# Patient Record
Sex: FEMALE | Race: WHITE | Employment: FULL TIME | URBAN - METROPOLITAN AREA
[De-identification: names, ages, dates, MRNs, and addresses within clinical notes are randomized per-mention and may not be internally consistent; named-entity substitution may affect disease eponyms.]

---

## 2021-06-06 ENCOUNTER — HOSPITAL ENCOUNTER (EMERGENCY)
Age: 61
Discharge: HOME | End: 2021-06-06
Payer: COMMERCIAL

## 2021-06-06 VITALS
RESPIRATION RATE: 20 BRPM | WEIGHT: 112 LBS | HEIGHT: 60 IN | OXYGEN SATURATION: 98 % | BODY MASS INDEX: 21.99 KG/M2 | HEART RATE: 70 BPM | DIASTOLIC BLOOD PRESSURE: 66 MMHG | SYSTOLIC BLOOD PRESSURE: 112 MMHG | TEMPERATURE: 98.4 F

## 2021-06-06 DIAGNOSIS — J34.89 INFECTED LESION IN NOSE: Primary | ICD-10-CM

## 2021-06-06 PROCEDURE — 99211 OFF/OP EST MAY X REQ PHY/QHP: CPT

## 2021-06-06 RX ORDER — DOXYCYCLINE HYCLATE 100 MG
100 TABLET ORAL 2 TIMES DAILY
Qty: 14 TABLET | Refills: 0 | Status: SHIPPED | OUTPATIENT
Start: 2021-06-06 | End: 2021-06-13

## 2021-06-06 NOTE — ED PROVIDER NOTES
[address] [facility]  Department of Emergency Medicine  UC Encounter Note  21   3:08 PM EDT      NAME: Rene Cavazos  :  1960  MRN:  [id number]    Chief Complaint: Facial Pain (has had nasal infection  right side  of nasal  sore  was bleeding )      This is a 51-year-old female the presents to urgent care complaining of a tender sore area to the right nostril that she noticed recently. She states there was some bleeding from that site recently. He denies injury. She denies being on any blood thinners. She states that there is some tenderness to the right maxillary sinus area. She denies any difficulty breathing or swallowing. No chest pain. On first contact patient she appears to be in no acute distress. Review of Systems  Pertinent positives and negatives are stated within HPI, all other systems reviewed and are negative. Physical Exam  Vitals and nursing note reviewed. Constitutional:       Appearance: She is well-developed. HENT:      Head: Normocephalic and atraumatic. Right Ear: Hearing and external ear normal.      Left Ear: Hearing and external ear normal.      Nose: Nasal tenderness present. No nasal deformity, signs of injury, laceration or mucosal edema. Right Nostril: No foreign body, epistaxis, septal hematoma or occlusion. Left Nostril: No epistaxis, septal hematoma or occlusion. Right Turbinates: Not enlarged, swollen or pale. Left Turbinates: Not enlarged, swollen or pale. Right Sinus: Maxillary sinus tenderness present. No frontal sinus tenderness. Left Sinus: No maxillary sinus tenderness or frontal sinus tenderness. Comments: In the anterior right nasal septum area she does have an erosion of the skin about 3 mm in diameter. No active bleeding at this time. Mouth/Throat:      Pharynx: Uvula midline.    Eyes:      General: Lids are normal.      Conjunctiva/sclera: Conjunctivae normal.      Pupils: Pupils are equal, round, and reactive to light. Cardiovascular:      Rate and Rhythm: Normal rate and regular rhythm. Heart sounds: Normal heart sounds. No murmur heard. Pulmonary:      Effort: Pulmonary effort is normal.      Breath sounds: Normal breath sounds. Abdominal:      General: Bowel sounds are normal.      Palpations: Abdomen is soft. Abdomen is not rigid. Tenderness: There is no abdominal tenderness. There is no guarding or rebound. Musculoskeletal:      Cervical back: Normal range of motion and neck supple. Skin:     General: Skin is warm and dry. Findings: No abrasion or rash. Neurological:      Mental Status: She is alert and oriented to person, place, and time. GCS: GCS eye subscore is 4. GCS verbal subscore is 5. GCS motor subscore is 6. Cranial Nerves: No cranial nerve deficit. Sensory: No sensory deficit. Coordination: Coordination normal.      Gait: Gait normal.         Procedures    MDM  Number of Diagnoses or Management Options  Infected lesion in nose  Diagnosis management comments: Concern is for possible MRSA type of infection. I will place her on a medication that is appropriate for this. Have her follow-up with her primary care provider for recheck. --------------------------------------------- PAST HISTORY ---------------------------------------------  Past Medical History:  has no past medical history on file. Past Surgical History:  has no past surgical history on file. Social History:  reports that she has never smoked. She has never used smokeless tobacco.    Family History: family history is not on file. The patients home medications have been reviewed. Allergies: Patient has no known allergies. -------------------------------------------------- RESULTS -------------------------------------------------  No results found for this visit on 06/06/21.   No orders to display       -------------------------

## 2022-01-07 ENCOUNTER — HOSPITAL ENCOUNTER (OUTPATIENT)
Dept: PHYSICAL THERAPY | Age: 62
Setting detail: THERAPIES SERIES
Discharge: HOME | End: 2022-01-07
Payer: COMMERCIAL

## 2022-01-07 NOTE — PROGRESS NOTES
Physical Therapy Progress Note    Date: 2022  Patient Name: Rene Cavazos  : 1960   MRN: [id number]    Pt called to cancel PT eval appt.  Today, rescheduled    Elsy Marks PT

## 2022-01-12 NOTE — PROGRESS NOTES
[facility] OUTPATIENT REHABILITATION  PHYSICAL THERAPY INITIAL EVALUATION         Date:  2022   Patient: Brianne Quiñones  : 1960  MRN: [id number]  Referring Provider: Kamran Mosqueda  [address] [place] [address],  [address]     Medical Diagnosis: Left shoulder pain  Onset date: 2 years ago  Mechanism of Injury: insidious onset  Chief complaint: Intermittent tingling in neck and both arms, keeps her up at night. Tightness in left shoulder     SUBJECTIVE:     Past Medical History  No past medical history on file. No past surgical history on file. Medications:   No current outpatient medications on file. No current facility-administered medications for this encounter. Imaging results: No results found. 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 Status: Full Time    Hobbies: walking     Patient Goals:  To see if strengthening helps  Medical Management for Current Problem:  [] Chiro  []  CT:  [x]  Injection: injection  []  Meds:   [x]  MRI:  []  Ortho  []  PCP  [x]  PT/OT  []  X-ray:  []  Surgery:  []  Other:     Precautions/Contraindications: none    OBJECTIVE:     Inspection:  Standing  Scapulo-humeral Rhythm: R: [x] Normal  [] Irregular    L:  [] Normal  [] Irregular   Thoracic:                             [x] Normal  [] Increased Kyphosis  [] Decreased Kyphosis   Lumbar:                     [] Normal  [] Increased Lordosis  [] Decreased Lordosis                  Joint/Motion:  Right Shoulder:  AROM:WNL    Left Shoulder:  AROM: WNL    Strength:  Right Shoulder: Flexion 4/5,  Abduction 4/5, ER 4/5, IR 4/5      Left Shoulder: Flexion 4/5,  Abduction 4/5, ER 4/5, IR 4/5     Palpation: Tender to palpation left posterior Physician's Printed Name:                                           [de-identified] Signature:                                                               Date:

## 2022-01-13 ENCOUNTER — HOSPITAL ENCOUNTER (OUTPATIENT)
Dept: PHYSICAL THERAPY | Age: 62
Setting detail: THERAPIES SERIES
Discharge: HOME | End: 2022-01-13
Payer: COMMERCIAL

## 2022-01-13 PROCEDURE — 97162 PT EVAL MOD COMPLEX 30 MIN: CPT | Performed by: PHYSICAL THERAPIST

## 2022-01-13 PROCEDURE — 97110 THERAPEUTIC EXERCISES: CPT | Performed by: PHYSICAL THERAPIST

## 2022-01-20 ENCOUNTER — HOSPITAL ENCOUNTER (OUTPATIENT)
Dept: PHYSICAL THERAPY | Age: 62
Setting detail: THERAPIES SERIES
Discharge: HOME | End: 2022-01-20
Payer: COMMERCIAL

## 2022-01-20 NOTE — PROGRESS NOTES
Physical Therapy Progress Note    Date: 2022  Patient Name: Milo Mcmullen  : 1960   MRN: [id number]    Pt called to cancel PT appt. Today.  Ill    Zain Bailey, PT

## 2022-02-02 ENCOUNTER — HOSPITAL ENCOUNTER (OUTPATIENT)
Dept: PHYSICAL THERAPY | Age: 62
Setting detail: THERAPIES SERIES
Discharge: HOME | End: 2022-02-02
Payer: COMMERCIAL

## 2022-02-02 PROCEDURE — 97110 THERAPEUTIC EXERCISES: CPT

## 2022-02-02 PROCEDURE — 97530 THERAPEUTIC ACTIVITIES: CPT

## 2022-02-02 NOTE — PROGRESS NOTES
[facility]  Physical Therapy Daily Treatment Note  Date: 2022  Patient Name: Briseida Chavez  : 1960   MRN: [id number]  DOInjury: na  DOSx: na   Referring Provider: Cinthia Benson  [address] [place] [address] [address],  [address]     Medical Diagnosis: shoulder pain. Upper back  Outcome Measure: QUICK DASH=14, 7%    S: Patient reports independent with HEP, given additional exercises, patient states a good understanding. O: Patient was given green theratubing and additional exercises to HEP, see copy. Time 0204-0356 1x/wk, 6 weeks    Visit 2/6 Repeat outcome measure at mid point and end. Pain 0/10     ROM WNL     Modalities             There Ex      UBE            Shrugs 2# 2 x 10     Shld flex      Shldr abd      Overhead press Green 2 x 10     ER Green 2 x 10 LUE     IR Green 2 x 10 LUE           Functional activities To aid in ROM and strength needed for reaching , lifting ,pushing and pulling at [employer] 2 x 10      ROWS: M Green 2 x 10 \"    ROWS: L Green 2 x 10 \"    Punches Green 2 x 10 \"          A:  Tolerated well. Above added to written HEP. P: Continue with rehab plan.   Sandra Garber, PTA    Treatment Charges: Mins Units   Initial Evaluation     Re-Evaluation     Ther Exercise         TE 23 2   Manual Therapy     MT     Ther Activities        TA 17 1   Gait Training          GT     Neuro Re-education NR     Modalities     Non-Billable Service Time     Other     Total Time/Units 40 3

## 2022-02-11 ENCOUNTER — HOSPITAL ENCOUNTER (OUTPATIENT)
Dept: PHYSICAL THERAPY | Age: 62
Setting detail: THERAPIES SERIES
Discharge: HOME | End: 2022-02-11
Payer: COMMERCIAL

## 2022-02-11 NOTE — PROGRESS NOTES
Physical Therapy - Cancellation    2/11/2022    MRN: [id number]  Antonio Half      Patient called to cancel appointment. Her dog is ill, she is on her way to vet. Rescheduled.     Lorenza Weiss, PTA

## 2022-02-18 ENCOUNTER — APPOINTMENT (OUTPATIENT)
Dept: PHYSICAL THERAPY | Age: 62
End: 2022-02-18
Payer: COMMERCIAL

## 2022-02-21 ENCOUNTER — HOSPITAL ENCOUNTER (OUTPATIENT)
Dept: PHYSICAL THERAPY | Age: 62
Setting detail: THERAPIES SERIES
Discharge: HOME | End: 2022-02-21
Payer: COMMERCIAL

## 2022-02-21 PROCEDURE — 97530 THERAPEUTIC ACTIVITIES: CPT | Performed by: PHYSICAL THERAPIST

## 2022-02-21 PROCEDURE — 97110 THERAPEUTIC EXERCISES: CPT | Performed by: PHYSICAL THERAPIST

## 2022-02-21 NOTE — PROGRESS NOTES
[facility]  Physical Therapy Daily Treatment Note  Date: 2022  Patient Name: Fanny Orr  : 1960   MRN: [id number]  DOInjury: na  DOSx: na   Referring Provider: Diana Petty  [address] [place] [address] [address],  [address]     Medical Diagnosis: shoulder pain. Upper back  Outcome Measure: QUICK DASH=14, 7%    S: Patient reports independent with HEP, given additional exercises, patient states a good understanding. O: Patient was given green theratubing and additional exercises to HEP, see copy. Time 15:10-15:56 1x/wk, 6 weeks    Visit 2/6 Repeat outcome measure at mid point and end. Pain 0/10     ROM WNL     Modalities             There Ex      UBE L4 x 2 min fwd         X 2 min bkwd           Shrugs 2# 2 x 10     Shld flex 2#  2 x 10     Shldr abd      Overhead press Green 2 x 10     ER Green 2 x 10 LUE     IR Green 2 x 10 LUE           Functional activities To aid in ROM and strength needed for reaching , lifting ,pushing and pulling at [employer] 2 x 10      ROWS: M Green 2 x 10 \"    ROWS: L Green 2 x 10 \"    Punches Green 2 x 10 \"          A:  Tolerated well. Above added to written HEP. P: Continue with rehab plan.   Pop Howard PT    Treatment Charges: Mins Units   Initial Evaluation     Re-Evaluation     Ther Exercise         TE 29 2   Manual Therapy     MT     Ther Activities        TA 17 1   Gait Training          GT     Neuro Re-education NR     Modalities     Non-Billable Service Time     Other     Total Time/Units 46 3

## 2022-03-09 NOTE — PROGRESS NOTES
Physical Therapy Progress Note    Date: 3/9/2022  Patient Name: Hayder Martinez  : 1960   MRN: [id number]    Pt called to cancel PT appt.  For tomorrow    Jesus How, PT

## 2022-03-10 ENCOUNTER — HOSPITAL ENCOUNTER (OUTPATIENT)
Dept: PHYSICAL THERAPY | Age: 62
Setting detail: THERAPIES SERIES
Discharge: HOME | End: 2022-03-10
Payer: COMMERCIAL

## 2022-03-16 ENCOUNTER — HOSPITAL ENCOUNTER (OUTPATIENT)
Dept: PHYSICAL THERAPY | Age: 62
Setting detail: THERAPIES SERIES
Discharge: HOME | End: 2022-03-16
Payer: COMMERCIAL

## 2022-03-16 PROCEDURE — 97110 THERAPEUTIC EXERCISES: CPT | Performed by: PHYSICAL THERAPIST

## 2022-03-16 PROCEDURE — 97530 THERAPEUTIC ACTIVITIES: CPT | Performed by: PHYSICAL THERAPIST

## 2022-03-16 NOTE — PROGRESS NOTES
[facility]  Physical Therapy Daily Treatment Note  Date: 2022  Patient Name: Adelso Caicedo  : 1960   MRN: [id number]  DOInjury: na  DOSx: na   Referring Provider: Missy Perez  [address] [place] [address] [facility] [address],  [address]     Medical Diagnosis: shoulder pain. Upper back  Outcome Measure: QUICK DASH=14, 7%    S: Patient reports independent with HEP, given additional exercises, patient states a good understanding. O: Patient was given green theratubing and additional exercises to HEP, see copy. Time 15:10-15:59 1x/wk, 6 weeks    Visit 4/6 Repeat outcome measure at mid point and end. Pain 0/10     ROM WNL     Modalities             There Ex      UBE L4 x 2 min fwd         X 2 min bkwd           Shrugs 2# 2 x 10     Shld flex 2#  2 x 10     Shldr abd      Overhead press Green 2 x 10     ER Green 2 x 10 LUE     IR Green 2 x 10 LUE           Functional activities To aid in ROM and strength needed for reaching , lifting ,pushing and pulling at [employer] 2 x 10      ROWS: M Green 2 x 20 \"    ROWS: L Green 2 x 10 \"    Punches Green 2 x 10 \"          A:  Tolerated well. Above added to written HEP. P: Continue with rehab plan.   Manjinder Ro, PT    Treatment Charges: Mins Units   Initial Evaluation     Re-Evaluation     Ther Exercise         TE 29 2   Manual Therapy     MT     Ther Activities        TA 20 1   Gait Training          GT     Neuro Re-education NR     Modalities     Non-Billable Service Time     Other     Total Time/Units 49 3

## 2022-03-30 ENCOUNTER — HOSPITAL ENCOUNTER (OUTPATIENT)
Dept: PHYSICAL THERAPY | Age: 62
Setting detail: THERAPIES SERIES
Discharge: HOME | End: 2022-03-30
Payer: COMMERCIAL

## 2022-03-30 PROCEDURE — 97110 THERAPEUTIC EXERCISES: CPT | Performed by: PHYSICAL THERAPIST

## 2022-03-30 PROCEDURE — 97530 THERAPEUTIC ACTIVITIES: CPT | Performed by: PHYSICAL THERAPIST

## 2022-03-30 NOTE — PROGRESS NOTES
[facility]  Physical Therapy Daily Treatment Note  Date: 2022  Patient Name: Hayder Martinez  : 1960   MRN: [id number]  DOInjury: na  DOSx: na   Referring Provider: Spurgeon Hodgkin  [address] [place] [address] [address],  [address]     Medical Diagnosis: shoulder pain. Upper back  Outcome Measure: QUICK DASH=14, 7%    S: Patient reports independent with HEP, given additional exercises, patient states a good understanding. O: Patient was given green theratubing and additional exercises to HEP, see copy. Time 15:10-15:56 1x/wk, 6 weeks    Visit 4/6 Repeat outcome measure at mid point and end. Pain 0/10     ROM WNL     Modalities             There Ex      UBE L4 x 2 min fwd         X 2 min bkwd           Shrugs 2# 2 x 12     Shld flex 2#  2 x 12     Shldr abd      Overhead press Green 2 x 10     ER Green 2 x 10 LUE     IR Green 2 x 10 LUE           Functional activities To aid in ROM and strength needed for reaching , lifting ,pushing and pulling at [employer] 2 x 10      ROWS: M Green 2 x 20 \"    ROWS: L Green 2 x 10 \"    Punches Green 2 x 10 \"          A:  Tolerated well. Above added to written HEP. P: Continue with rehab plan.   Jesus Saunders, PT    Treatment Charges: Mins Units   Initial Evaluation     Re-Evaluation     Ther Exercise         TE 26 2   Manual Therapy     MT     Ther Activities        TA 20 1   Gait Training          GT     Neuro Re-education NR     Modalities     Non-Billable Service Time     Other     Total Time/Units 46 3

## 2022-04-12 NOTE — PROGRESS NOTES
Physical Therapy   DISCHARGE NOTE    PATIENT: Mark Whittington   Date: 4/12/2022  DIAGNOSIS:  shoulder pain. Upper back  PHYSICIAN: Rocky Powers  [address] [facility] [address],  [address]     ATTENDANCE:  6  OUT OF 10 APPOINTMENT FROM  1/13/2022 TO 04/12/22  TREATMENTS RECEIVED:  THEREX,   HEP,   ROM    INITIAL PROBLEM CURRENT STATUS       Palpable pain left axilla   continues        Altered sensation left shoulder, buzzing continues        DECREASED STRENGTH LUE     Shoulder     flex=      4/5   abd =      4/5        ir=      4/5                 Er=      4/5              Shoulder     flex=      4+/5   abd =      5/5        ir=      4+/5                 Er=      5/5      DECREASED STRENGTH RUE    Shoulder       flex= 4/5    abd =   4/5        ir= 4/5                   Er= 4/5          Shoulder       flex= 5/5    abd =   5/5        ir= 5/5                   Er= 5/5          Mild fwd head rounded shouders Improved posture         COMMENTS/ RECOMMENDATIONS:  Pt has completed POC. Odd buzzing sensation continues. Pt encouraged to continue with HEP.       [address] 68 YOU FOR THE OPPORTUNITY TO WORK WITH WITH THIS PATIENT  [facility] PT            IF YOU HAVE ANY QUESTIONS OR COMMENTS, PLEASE FEEL FREE TO CONTACT ME AT   [address] [address]  FAX : [phone]  PHONE: [phone]

## 2022-04-13 ENCOUNTER — HOSPITAL ENCOUNTER (OUTPATIENT)
Dept: PHYSICAL THERAPY | Age: 62
Setting detail: THERAPIES SERIES
Discharge: HOME | End: 2022-04-13

## 2022-04-13 NOTE — PROGRESS NOTES
[facility]  Physical Therapy Daily Treatment Note  Date: 2022  Patient Name: Duke Lockhart  : 1960   MRN: [id number]  DOInjury: na  DOSx: na   Referring Provider: Nadya Blank  [address] [place] [address] [address],  [address]     Medical Diagnosis: shoulder pain. Upper back  Outcome Measure: QUICK DASH=14, 7%    S: Patient reports independent with HEP, given additional exercises, patient states a good understanding. O: Patient was given green theratubing and additional exercises to HEP, see copy. Time 15:05-16:05 1x/wk, 6 weeks    Visit  Repeat outcome measure at mid point and end. Pain 0/10     ROM WNL     Modalities             There Ex      UBE L4 x 2 min fwd         X 2 min bkwd           Shrugs 2# 2 x 12     Shld flex 2#  2 x 12     Shldr abd      Overhead press Green 2 x 10     ER Green 2 x 10 LUE     IR Green 2 x 10 LUE           Functional activities To aid in ROM and strength needed for reaching , lifting ,pushing and pulling at [employer] 2 x 10      ROWS: M Green 2 x 20 \"    ROWS: L Green 2 x 10 \"    Punches Green 2 x 10 \"          A:  Tolerated well.    See discharge note  P: Pt discharged from PT at this time, encouraged to continue HEP  Salvatore Askew PT    Treatment Charges: Mins Units   Initial Evaluation     Re-Evaluation     Ther Exercise         TE 15 1   Manual Therapy     MT     Ther Activities        TA 15 1   Gait Training          GT     Neuro Re-education NR     Modalities     Non-Billable Service Time     Other     Total Time/Units 30 3